# Patient Record
Sex: MALE | Race: WHITE | NOT HISPANIC OR LATINO | Employment: FULL TIME | URBAN - NONMETROPOLITAN AREA
[De-identification: names, ages, dates, MRNs, and addresses within clinical notes are randomized per-mention and may not be internally consistent; named-entity substitution may affect disease eponyms.]

---

## 2022-05-03 ENCOUNTER — APPOINTMENT (OUTPATIENT)
Dept: LAB | Facility: CLINIC | Age: 32
End: 2022-05-03

## 2022-05-03 ENCOUNTER — APPOINTMENT (OUTPATIENT)
Dept: URGENT CARE | Facility: CLINIC | Age: 32
End: 2022-05-03

## 2022-05-03 DIAGNOSIS — Z02.1 PRE-EMPLOYMENT EXAMINATION: Primary | ICD-10-CM

## 2022-05-03 DIAGNOSIS — Z02.1 PRE-EMPLOYMENT EXAMINATION: ICD-10-CM

## 2022-05-03 PROCEDURE — 86765 RUBEOLA ANTIBODY: CPT

## 2022-05-03 PROCEDURE — 36415 COLL VENOUS BLD VENIPUNCTURE: CPT

## 2022-05-03 PROCEDURE — 86735 MUMPS ANTIBODY: CPT

## 2022-05-03 PROCEDURE — 86762 RUBELLA ANTIBODY: CPT

## 2022-05-03 PROCEDURE — 86480 TB TEST CELL IMMUN MEASURE: CPT

## 2022-05-03 PROCEDURE — 86787 VARICELLA-ZOSTER ANTIBODY: CPT

## 2022-05-04 LAB — RUBV IGG SERPL IA-ACNC: 47.9 IU/ML

## 2022-05-05 LAB
GAMMA INTERFERON BACKGROUND BLD IA-ACNC: 0.02 IU/ML
M TB IFN-G BLD-IMP: NEGATIVE
M TB IFN-G CD4+ BCKGRND COR BLD-ACNC: 0 IU/ML
M TB IFN-G CD4+ BCKGRND COR BLD-ACNC: 0 IU/ML
MEV IGG SER QL: NORMAL
MITOGEN IGNF BCKGRD COR BLD-ACNC: >10 IU/ML

## 2022-05-06 LAB
MUV IGG SER QL: NORMAL
VZV IGG SER IA-ACNC: NORMAL

## 2022-08-06 ENCOUNTER — HOSPITAL ENCOUNTER (EMERGENCY)
Facility: HOSPITAL | Age: 32
Discharge: HOME | End: 2022-08-06
Attending: EMERGENCY MEDICINE

## 2022-08-06 VITALS
HEART RATE: 97 BPM | TEMPERATURE: 100.7 F | OXYGEN SATURATION: 96 % | RESPIRATION RATE: 18 BRPM | SYSTOLIC BLOOD PRESSURE: 111 MMHG | DIASTOLIC BLOOD PRESSURE: 77 MMHG

## 2022-08-06 DIAGNOSIS — J06.9 VIRAL URI: Primary | ICD-10-CM

## 2022-08-06 LAB
FLUAV RNA RESP QL NAA+PROBE: NEGATIVE
FLUBV RNA RESP QL NAA+PROBE: NEGATIVE
RSV RNA RESP QL NAA+PROBE: NEGATIVE
SARS-COV-2 RNA RESP QL NAA+PROBE: POSITIVE

## 2022-08-06 PROCEDURE — 99281 EMR DPT VST MAYX REQ PHY/QHP: CPT

## 2022-08-06 PROCEDURE — 99284 EMERGENCY DEPT VISIT MOD MDM: CPT | Performed by: EMERGENCY MEDICINE

## 2022-08-06 PROCEDURE — 0241U HB NFCT DS VIR RESP RNA 4 TRGT: CPT | Performed by: EMERGENCY MEDICINE

## 2022-08-06 NOTE — Clinical Note
oRger Bailey was seen and treated in our emergency department on 8/6/2022  Diagnosis: KAYLA Hou  may return to work on return date  He may return on this date: 08/12/2022         If you have any questions or concerns, please don't hesitate to call        Sloane Killian MD    ______________________________           _______________          _______________  Hospital Representative                              Date                                Time

## 2022-08-06 NOTE — Clinical Note
Williams Prescott was seen and treated in our emergency department on 8/6/2022     ?    ?    ? Diagnosis: KAYLA Fernandez  may return to work on return date  He may return on this date: 08/12/2022    ? If you have any questions or concerns, please don't hesitate to call        Gera Lerma MD    ______________________________           _______________          _______________  Hospital Representative                              Date                                Time

## 2022-08-06 NOTE — Clinical Note
Nicki Kussmaul was seen and treated in our emergency department on 8/6/2022  Diagnosis: KAYLA Green  may return to work on return date  He may return on this date: 08/12/2022         If you have any questions or concerns, please don't hesitate to call        Sofi Florence MD    ______________________________           _______________          _______________  Hospital Representative                              Date                                Time

## 2022-08-07 NOTE — RESULT ENCOUNTER NOTE
I spoke with [employer] and let him know that his COVID-19 swab was positive  Continue symptomatic treatment  Advised he implement home isolation measures including      Staying home  Stay in a specific "sick room" or area and away from other people or animals, including pets  Wear a mask when leaving your room  Use a separate bathroom, if available  Wipe down all commonly touched surfaces with household   Please schedule follow up video visit tomorrow

## 2022-08-07 NOTE — ED ATTENDING ATTESTATION
8/6/2022  IAna MD, saw and evaluated the patient  I have discussed the patient with the resident/non-physician practitioner and agree with the resident's/non-physician practitioner's findings, Plan of Care, and MDM as documented in the resident's/non-physician practitioner's note, except where noted  All available labs and Radiology studies were reviewed  I was present for key portions of any procedure(s) performed by the resident/non-physician practitioner and I was immediately available to provide assistance  At this point I agree with the current assessment done in the Emergency Department  I have conducted an independent evaluation of this patient a history and physical is as follows:    ED Course     43-year-old male, hospital employee, presenting to the emergency department for evaluation of 1 day history of fever, generalized body aching, nonproductive cough, sore throat, sinus pressure  Wife has similar symptoms but only became positive today  Patient has been immunized against COVID with 1 booster  The patient is resting comfortably on a stretcher in no acute respiratory distress  The patient appears nontoxic  HEENT reveals moist mucous membranes  Head is normocephalic and atraumatic  Conjunctiva and sclera are normal  Neck is nontender and supple with full range of motion to flexion, extension, lateral rotation  No meningismus appreciated  No masses are appreciated  Lungs are clear to auscultation bilaterally without any wheezes, rales or rhonchi  Heart is regular rate and rhythm without any murmurs, rubs or gallops  Abdomen is soft and nontender without any rebound or guarding  Extremities appear grossly normal without any significant arthropathy  Patient is awake, alert, and oriented x3  The patient has normal interaction  Motor is 5 out of 5            Labs Reviewed - No data to display        Critical Care Time  Procedures

## 2022-08-07 NOTE — DISCHARGE INSTRUCTIONS
Please check your MyChart to see the results of your COVID test   Please continue treating her symptoms with Tylenol Motrin  Please return to the emergency department if you develop any new or worsening symptoms such as shortness of breath, chest pain, or severe abdominal pain

## 2022-08-07 NOTE — ED PROVIDER NOTES
HPI: Patient is a 33 y o  male who presents with 2 days of fever, cough, headache, sore throat and myalgias which the patient describes at mild The patient has not had contact with people with similar symptoms  The patient taken OTC medication with relief of symptoms  No Known Allergies    History reviewed  No pertinent past medical history  History reviewed  No pertinent surgical history  Nursing notes reviewed  Physical Exam:  ED Triage Vitals [08/06/22 1919]   Temperature Pulse Respirations Blood Pressure SpO2   (!) 100 7 °F (38 2 °C) 97 18 111/77 96 %      Temp Source Heart Rate Source Patient Position - Orthostatic VS BP Location FiO2 (%)   Oral Monitor -- -- --      Pain Score       --           ROS: Positive for sore throat, fever, myalgias, cough, and sinus pressure, the remainder of a 10 organ system ROS was otherwise unremarkable  General: awake, alert, no acute distress    Head: normocephalic, atraumatic    Eyes: no scleral icterus  Ears: external ears normal, hearing grossly intact  Nose: external exam grossly normal, negative nasal discharge  Neck: symmetric, No JVD noted, trachea midline  Pulmonary: no respiratory distress, no tachypnea noted  Cardiovascular: appears well perfused  Abdomen: no distention noted  Musculoskeletal: no deformities noted, tone normal  Neuro: grossly non-focal  Psych: mood and affect appropriate    The patient is stable and has a history and physical exam consistent with a viral illness  COVID19 testing has been performed  I considered the patient's other medical conditions as applicable/noted above in my medical decision making  The patient is stable upon discharge  The plan is for supportive care at home  The patient (and any family present) verbalized understanding of the discharge instructions and warnings that would necessitate return to the Emergency Department  All questions were answered prior to discharge      Medications - No data to display  Final diagnoses:   Viral URI     Time reflects when diagnosis was documented in both MDM as applicable and the Disposition within this note     Time User Action Codes Description Comment    8/6/2022  8:03 PM Sinan Formosa Add [J06 9] Viral URI       ED Disposition     ED Disposition   Discharge    Condition   Stable    Date/Time   Sat Aug 6, 2022  8:02 PM    [address] discharge to home/self care  Follow-up Information     Follow up With Specialties Details Why Contact Info Additional [address] Emergency Department Emergency Medicine Go to  If symptoms worsen or if you have any other specific concerns April 10 [id number]  [address] Emergency Department, [address] Family Medicine Call today To make appointment for reevaluation if you do not have a PCP [address] [phone]  [place] [address], [facility]        There are no discharge medications for this patient  No discharge procedures on file      Electronically Signed by       Jillian Carcamo MD  08/06/22 [id number]